# Patient Record
Sex: FEMALE | Race: WHITE | Employment: FULL TIME | URBAN - METROPOLITAN AREA
[De-identification: names, ages, dates, MRNs, and addresses within clinical notes are randomized per-mention and may not be internally consistent; named-entity substitution may affect disease eponyms.]

---

## 2018-10-02 ENCOUNTER — OFFICE VISIT (OUTPATIENT)
Dept: ORTHOPEDIC SURGERY | Age: 33
End: 2018-10-02
Payer: COMMERCIAL

## 2018-10-02 VITALS
HEIGHT: 65 IN | BODY MASS INDEX: 24.16 KG/M2 | SYSTOLIC BLOOD PRESSURE: 116 MMHG | WEIGHT: 145 LBS | DIASTOLIC BLOOD PRESSURE: 82 MMHG

## 2018-10-02 DIAGNOSIS — M25.371 INSTABILITY OF RIGHT ANKLE JOINT: Primary | ICD-10-CM

## 2018-10-02 PROCEDURE — L1902 AFO ANKLE GAUNTLET PRE OTS: HCPCS | Performed by: ORTHOPAEDIC SURGERY

## 2018-10-02 PROCEDURE — L4361 PNEUMA/VAC WALK BOOT PRE OTS: HCPCS | Performed by: ORTHOPAEDIC SURGERY

## 2018-10-02 PROCEDURE — 99204 OFFICE O/P NEW MOD 45 MIN: CPT | Performed by: ORTHOPAEDIC SURGERY

## 2018-10-02 RX ORDER — PREDNISONE 10 MG/1
TABLET ORAL
Qty: 14 TABLET | Refills: 0 | Status: SHIPPED | OUTPATIENT
Start: 2018-10-02

## 2018-10-03 ENCOUNTER — TELEPHONE (OUTPATIENT)
Dept: ORTHOPEDIC SURGERY | Age: 33
End: 2018-10-03

## 2018-10-17 ENCOUNTER — HOSPITAL ENCOUNTER (OUTPATIENT)
Dept: PHYSICAL THERAPY | Age: 33
Setting detail: THERAPIES SERIES
Discharge: HOME | End: 2018-10-17
Payer: COMMERCIAL

## 2018-10-17 PROCEDURE — 97161 PT EVAL LOW COMPLEX 20 MIN: CPT | Performed by: PHYSICAL THERAPIST

## 2018-10-17 PROCEDURE — 97110 THERAPEUTIC EXERCISES: CPT | Performed by: PHYSICAL THERAPIST

## 2018-10-17 PROCEDURE — 97112 NEUROMUSCULAR REEDUCATION: CPT | Performed by: PHYSICAL THERAPIST

## 2018-10-17 PROCEDURE — G8979 MOBILITY GOAL STATUS: HCPCS | Performed by: PHYSICAL THERAPIST

## 2018-10-17 PROCEDURE — G8978 MOBILITY CURRENT STATUS: HCPCS | Performed by: PHYSICAL THERAPIST

## 2018-10-17 NOTE — PLAN OF CARE
Walking and moving around  Mobility: Walking and Moving Around Current Status (): At least 40 percent but less than 60 percent impaired, limited or restricted  Mobility: Walking and Moving Around Goal Status (): At least 20 percent but less than 40 percent impaired, limited or restricted    Pain Scale: 0/10  Easing factors: boot, resting  Provocative factors: activity     Type: []Constant   []Intermittent  []Radiating []Localized []other:     Numbness/Tingling: none    Occupation/School:     Living Status/Prior Level of Function: Independent with ADLs and IADLs, horseback riding, walking program, HIT workout, weight lifting routine    OBJECTIVE:     ROM LEFT RIGHT   HIP Flex     HIP Abd     HIP Ext     HIP IR     HIP ER     Knee ext     Knee Flex     Ankle PF  WNL   Ankle DF  +3   Ankle In  40   Ankle Ev  20   Strength  LEFT RIGHT   HIP Flexors  4-   HIP Abductors  3+   HIP Ext  4-  Decreased glute firing- back first and hard   Hip ER  3+   Knee EXT (quad)  5   Knee Flex (HS)     Ankle DF  4   Ankle PF     Ankle Inv  5-   Ankle EV  4   Great toe ext/ flex  4 both   Circumference  Mid apex  7 cm prox             Reflexes/Sensation:    []Dermatomes/Myotomes intact    [x]Reflexes equal and normal bilaterally   []Other:    Joint mobility:    []Normal    []Hypo   [x]Hyper    Palpation:  Not grossly TTP; minimal over ATFL    Functional Mobility/Transfers: ind. Posture: forefoot varus/ PF first ray/ calc amol (increased in WB)    Bandages/Dressings/Incisions: NA    Gait: (include devices/WB status) using walking boot. overpronation through both feet, foot slap noted B    Orthopedic Special Tests: SL stance poor R> L, EC>EO                       [x] Patient history, allergies, meds reviewed. Medical chart reviewed. See intake form. Review Of Systems (ROS):  [x]Performed Review of systems (Integumentary, CardioPulmonary, Neurological) by intake and observation.  Intake form has been scanned into medical record. Patient has been instructed to contact their primary care physician regarding ROS issues if not already being addressed at this time. Co-morbidities/Complexities (which will affect course of rehabilitation):   [x]None           Arthritic conditions   []Rheumatoid arthritis (M05.9)  []Osteoarthritis (M19.91)   Cardiovascular conditions   []Hypertension (I10)  []Hyperlipidemia (E78.5)  []Angina pectoris (I20)  []Atherosclerosis (I70)   Musculoskeletal conditions   []Disc pathology   []Congenital spine pathologies   []Prior surgical intervention  []Osteoporosis (M81.8)  []Osteopenia (M85.8)   Endocrine conditions   []Hypothyroid (E03.9)  []Hyperthyroid Gastrointestinal conditions   []Constipation (D05.13)   Metabolic conditions   []Morbid obesity (E66.01)  []Diabetes type 1(E10.65) or 2 (E11.65)   []Neuropathy (G60.9)     Pulmonary conditions   []Asthma (J45)  []Coughing   []COPD (J44.9)   Psychological Disorders  []Anxiety (F41.9)  []Depression (F32.9)   []Other:   []Other:          Barriers to/and or personal factors that will affect rehab potential:              [x]Age  []Sex              [x]Motivation/Lack of Motivation                        [x]Co-Morbidities              []Cognitive Function, education/learning barriers              []Environmental, home barriers              []profession/work barriers  [x]past PT/medical experience  []other:  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 [] Falls Risk assessed and Patient requires intervention due to being higher risk   TUG score (>12s at risk):     [] Falls education provided, including       G-Codes:  PT G-Codes  Functional Assessment Tool Used: LEFS  Score: 53%  Functional Limitation: Mobility: Walking and moving around  Mobility: Walking and Moving Around Current Status ():  At least 40 percent but less than 60 percent

## 2018-10-24 ENCOUNTER — HOSPITAL ENCOUNTER (OUTPATIENT)
Dept: PHYSICAL THERAPY | Age: 33
Setting detail: THERAPIES SERIES
Discharge: HOME | End: 2018-10-24
Payer: COMMERCIAL

## 2018-10-24 PROCEDURE — 97110 THERAPEUTIC EXERCISES: CPT | Performed by: PHYSICAL THERAPIST

## 2018-10-24 PROCEDURE — 97112 NEUROMUSCULAR REEDUCATION: CPT | Performed by: PHYSICAL THERAPIST

## 2018-10-24 NOTE — FLOWSHEET NOTE
[facility], [address] [place]  Phone: [phone]  Fax [phone]    Physical Therapy Daily Treatment Note  Date:  10/24/2018    Patient Name:  Fiordaliza Siegel    :  1985  MRN: [id number]  Restrictions/Precautions:    Medical/Treatment Diagnosis Information:  Diagnosis: R23.140 (ICD-10-CM) - Instability of right ankle joint  Treatment Diagnosis: M25.371 (ICD-10-CM) - Instability of right ankle joint  Insurance/Certification information:  PT Insurance Information: 10/4/18 ANTHEM - $50/25 - $5000DED - $0CP - 20PT - 80/20% - NEEDS AUTH  Physician Information:  Referring Practitioner: Navjot Irizarry of care signed (Y/N):     Date of Patient follow up with Physician:     G-Code (if applicable):      Date G-Code Applied:  10-       Progress Note: [x]  Yes  []  No  Next due by: Visit #10       Latex Allergy:  [x]NO      []YES  Preferred Language for Healthcare:   [x]English       []other:    Visit # Insurance Allowable Requires auth   2 30    []no        [x]yes:       Pain level:  0/10     SUBJECTIVE:  Been walking more in the boot and not having a lot of pain. She notes improvement in the ankle/ foot control.      OBJECTIVE:   Observation:   Test measurements:      RESTRICTIONS/PRECAUTIONS: hypermobility noted, boot; wean to brace and shoe for activity    Exercises/Interventions:     Therapeutic Ex Sets/sec Reps Notes   Clamshell- heels elevated H5/ 2 10 hep   Towel scrunch w/ lift 20 reps  hep   Toe yoga H5 10 reps ea hep   Wobble board: 12,3,6,9,  20 reps ea    Seated TR/HR with ADD 2 10 ea                                        Manual Intervention                                          NMR re-education      glute re-ed sequence- tania 5 H5 5 ea hep   Prone over pillow- glute set  - SLR ext  -glute kick   H5   10 ea B                D1/ D2 ankle motion   Perturbations in neutral, PF,DF   2 sets 20 ea  25/ 10/10

## 2018-10-24 NOTE — FLOWSHEET NOTE
[facility], [address] [place]  Phone: [phone]  Fax [phone]      ATHLETIC TRAINING [address]  Date:  10/24/2018    Patient Name:  Aparna Ramirez    :  1985  MRN: [id number]  Restrictions/Precautions:    Medical/Treatment Diagnosis Information:  ·  R ankle joint instability  ·    Physician Information:   Dr. 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 Ecc.                      Inv. Calf Press Álvaro. Ecc.                        Inv.        GREGG   Flex               ABd               ADd              TKE               Ext        Steps Up               Up and Over               Down               Lateral               Rotation        Squats  mini                  wall                 BOSU         Lunges:  Lunge to Balance                   Balance to Lunge                   Walking        Knee Extension Bilat. Ecc.                               Inv. Hamstring Curls Bilat.                                Ecc.                               Inv.

## 2018-10-26 ENCOUNTER — HOSPITAL ENCOUNTER (OUTPATIENT)
Dept: PHYSICAL THERAPY | Age: 33
Setting detail: THERAPIES SERIES
Discharge: HOME | End: 2018-10-26
Payer: COMMERCIAL

## 2018-10-26 PROCEDURE — 97110 THERAPEUTIC EXERCISES: CPT | Performed by: PHYSICAL THERAPIST

## 2018-10-26 PROCEDURE — 97112 NEUROMUSCULAR REEDUCATION: CPT | Performed by: PHYSICAL THERAPIST

## 2018-10-26 NOTE — FLOWSHEET NOTE
Ecc.                                Inv.          Soleus Press Bilat. Ecc.                            Inv.                                Ladders                 Square                Jump/Hop  Low                       Med.                       High                                Reformer FW Walking 2R 2x10 2R 2x10   Reformer FW Parallel Heels 2R 2x10 ball  2R 2x10 ball    Reformer FW V Toes 2R 2x10 2R 2x10   Reformer FW Wide V Heels 2R 2x10 2R 2x10 V Toes   Reformer Arm Straps I/Y/T in TT 2R 2x10 ea 1R1B 2x10 ea   Reformer Arm Straps I hold w/alt kickout 2R 2x10 1R1B 2x10   Reformer Leg Straps Parallel Feet 2R ball add, ring abd 10x ea 2R ball add, ring abd 15x ea   Reformer Leg Straps V Feet 2R ball add 10x 2R ball add 15x   Reformer Leg Straps Circles 2R 10x ea 2R 15x ea                                  Modality declined declined   Initials                             JLW JLW   Time spent with PT assistant

## 2018-10-26 NOTE — FLOWSHEET NOTE
stance  H15 3    D1/ D2 ankle motion   Perturbations in neutral, PF,DF    20 ea      Weight shift  + lift  10  10 B        Therapeutic Exercise and NMR EXR  [x] (79942) Provided verbal/tactile cueing for activities related to strengthening, flexibility, endurance, ROM for improvements in LE, proximal hip, and core control with self care, mobility, lifting, ambulation. [x] (22905) Provided verbal/tactile cueing for activities related to improving balance, coordination, kinesthetic sense, posture, motor skill, proprioception  to assist with LE, proximal hip, and core control in self care, mobility, lifting, ambulation and eccentric single leg control.      NMR and Therapeutic Activities:    [x] (31308 or 46937) Provided verbal/tactile cueing for activities related to improving balance, coordination, kinesthetic sense, posture, motor skill, proprioception and motor activation to allow for proper function of core, proximal hip and LE with self care and ADLs  [x] (06866) Gait Re-education- Provided training and instruction to the patient for proper LE, core and proximal hip recruitment and positioning and eccentric body weight control with ambulation re-education including up and down stairs     Home Exercise Program:    [x] (74354) Reviewed/Progressed HEP activities related to strengthening, flexibility, endurance, ROM of core, proximal hip and LE for functional self-care, mobility, lifting and ambulation/stair navigation   [] (24384)Reviewed/Progressed HEP activities related to improving balance, coordination, kinesthetic sense, posture, motor skill, proprioception of core, proximal hip and LE for self care, mobility, lifting, and ambulation/stair navigation      Manual Treatments:  PROM / STM / Oscillations-Mobs:  G-I, II, III, IV (PA's, Inf., Post.)  [x] (98550) Provided manual therapy to mobilize LE, proximal hip and/or LS spine soft tissue/joints for the purpose of modulating pain, promoting relaxation,  increasing

## 2018-10-29 ENCOUNTER — HOSPITAL ENCOUNTER (OUTPATIENT)
Dept: PHYSICAL THERAPY | Age: 33
Setting detail: THERAPIES SERIES
Discharge: HOME | End: 2018-10-29
Payer: COMMERCIAL

## 2018-10-29 PROCEDURE — 97112 NEUROMUSCULAR REEDUCATION: CPT | Performed by: PHYSICAL THERAPIST

## 2018-10-29 PROCEDURE — 97110 THERAPEUTIC EXERCISES: CPT | Performed by: PHYSICAL THERAPIST

## 2018-11-01 ENCOUNTER — HOSPITAL ENCOUNTER (OUTPATIENT)
Dept: PHYSICAL THERAPY | Age: 33
Setting detail: THERAPIES SERIES
Discharge: HOME | End: 2018-11-01
Payer: COMMERCIAL

## 2018-11-01 PROCEDURE — 97140 MANUAL THERAPY 1/> REGIONS: CPT | Performed by: PHYSICAL THERAPIST

## 2018-11-01 PROCEDURE — G0283 ELEC STIM OTHER THAN WOUND: HCPCS | Performed by: PHYSICAL THERAPIST

## 2018-11-01 PROCEDURE — 97110 THERAPEUTIC EXERCISES: CPT | Performed by: PHYSICAL THERAPIST

## 2018-11-01 PROCEDURE — 97112 NEUROMUSCULAR REEDUCATION: CPT | Performed by: PHYSICAL THERAPIST

## 2018-11-01 NOTE — FLOWSHEET NOTE
[facility] [address] [place]  Phone: [phone]  Fax [phone]      ATHLETIC TRAINING [address] [facility]  Date:  2018    Patient Name:  Sylvia Salmeron    :  1985  MRN: [id number]  Restrictions/Precautions:    Medical/Treatment Diagnosis Information:  ·  R ankle joint instability     Physician Information:   Dr. 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 Ecc.                        Inv. Calf Press Álvaro. Ecc.                          Inv.            GREGG   Flex                 ABd                 ADd                TKE                 Ext            Steps Up                 Up and Over                 Down                 Lateral                 Rotation            Squats  mini                    wall                   BOSU             Lunges:  Lunge to Balance                     Balance to Lunge                     Walking            Knee Extension Bilat.                                                  Ecc.

## 2018-11-05 ENCOUNTER — HOSPITAL ENCOUNTER (OUTPATIENT)
Dept: PHYSICAL THERAPY | Age: 33
Setting detail: THERAPIES SERIES
Discharge: HOME | End: 2018-11-05
Payer: COMMERCIAL

## 2018-11-05 PROCEDURE — 97112 NEUROMUSCULAR REEDUCATION: CPT | Performed by: PHYSICAL THERAPIST

## 2018-11-05 PROCEDURE — 97110 THERAPEUTIC EXERCISES: CPT | Performed by: PHYSICAL THERAPIST

## 2018-11-05 PROCEDURE — 97140 MANUAL THERAPY 1/> REGIONS: CPT | Performed by: PHYSICAL THERAPIST

## 2018-11-05 PROCEDURE — G0283 ELEC STIM OTHER THAN WOUND: HCPCS | Performed by: PHYSICAL THERAPIST

## 2018-11-05 NOTE — FLOWSHEET NOTE
[facility], [address] [place]  Phone: [phone]  Fax [phone]      ATHLETIC TRAINING [address]  Date:  2018    Patient Name:  Saeid Hus    :  1985  MRN: [id number]  Restrictions/Precautions:    Medical/Treatment Diagnosis Information:  ·  R ankle joint instability     Physician Information:   Dr. 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), hypermobile joints Out of boot, in brace      Bike       Elliptical       Treadmill       Airdyne              Gastroc stretch       Soleus stretch       Hamstring stretch       ITB stretch       Hip Flexor stretch       Quad stretch       Adductor stretch              Weight Shifting sp                                 fp                                 tp       Lateral walking (with/w/o TB)              Balance: PEP/Eleonora board    Eleonora Board R Ant/Pos Med/Lat CW/CCW 15x ea                  SLS    Airex FS/LS/TS 15x         Star excursion load/explode             Extremity reach UE/LE              Leg Press Álvaro. Ecc. 100# 3x10                     Inv.    80# 3x10          Calf Press Álvaro. 100# 3x10                      Ecc.      80# 3x10                      Inv.              GREGG   Flex                  ABd                  ADd                 TKE                  Ext              Steps Up                  Up and Over                  Down                  Lateral                  Rotation              Squats  mini                     wall                    BOSU               Lunges:  Lunge to Balance

## 2018-11-05 NOTE — FLOWSHEET NOTE
clams H30 3 reps each side    Side stepping with BVL at ankles      RDL 3# 2x10 reps    Monster walks with BVL  Manual Intervention      PF STM/ calc amol mob 10 mins                                   NMR re-education      glute re-ed sequence- tania 5 H5 5 ea hep   Prone over pillow- glute set  - SLR ext  -glute kick    SLS  -double YTB H ADD  -GTB ext   H5   10  2 x 10    SLS/ Tandem stance  H15 3    D1/ D2 ankle motion   Perturbations in neutral, PF,DF    2      NMR: BVL amol H10:10 10'         Therapeutic Exercise and NMR EXR  [x] (66182) Provided verbal/tactile cueing for activities related to strengthening, flexibility, endurance, ROM for improvements in LE, proximal hip, and core control with self care, mobility, lifting, ambulation. [x] (80270) Provided verbal/tactile cueing for activities related to improving balance, coordination, kinesthetic sense, posture, motor skill, proprioception  to assist with LE, proximal hip, and core control in self care, mobility, lifting, ambulation and eccentric single leg control.      NMR and Therapeutic Activities:    [x] (69947 or 25896) Provided verbal/tactile cueing for activities related to improving balance, coordination, kinesthetic sense, posture, motor skill, proprioception and motor activation to allow for proper function of core, proximal hip and LE with self care and ADLs  [x] (34163) Gait Re-education- Provided training and instruction to the patient for proper LE, core and proximal hip recruitment and positioning and eccentric body weight control with ambulation re-education including up and down stairs     Home Exercise Program:    [x] (49389) Reviewed/Progressed HEP activities related to strengthening, flexibility, endurance, ROM of core, proximal hip and LE for functional self-care, mobility, lifting and ambulation/stair navigation   [] (78523)Reviewed/Progressed HEP activities related to improving balance, coordination, kinesthetic sense, posture, motor skill, proprioception of core, proximal hip and LE for self care, mobility, lifting, and ambulation/stair navigation      Manual Treatments:  PROM / STM / Oscillations-Mobs:  G-I, II, III, IV (PA's, Inf., Post.)  [x] (77878) Provided manual therapy to mobilize LE, proximal hip and/or LS spine soft tissue/joints for the purpose of modulating pain, promoting relaxation,  increasing ROM, reducing/eliminating soft tissue swelling/inflammation/restriction, improving soft tissue extensibility and allowing for proper ROM for normal function with self care, mobility, lifting and ambulation. Modalities:  Declined     Charges:  Timed Code Treatment Minutes: 50   Total Treatment Minutes: 50 (ATC)     [x] EVAL (LOW) 74548 (typically 20 minutes face-to-face)  [] EVAL (MOD) 73165 (typically 30 minutes face-to-face)  [] EVAL (HIGH) 31418 (typically 45 minutes face-to-face)  [] RE-EVAL     [x] EM(46171) x  1   [] IONTO  [x] NMR (22381) x  1   [] VASO  [x] Manual (47621) x  1    [] Other:  [] TA x       [] Mech Traction (87354)  [] ES(attended) (23710)      [x] ES (un) (03269): NMR    GOALS:  Patient stated goal: return to work     Therapist goals for Patient:   Short Term Goals: To be achieved in: 2 weeks  1. Independent in HEP and progression per patient tolerance, in order to prevent re-injury. 2. Patient will have a decrease in pain to facilitate improvement in movement, function, and ADLs as indicated by Functional Deficits.     Long Term Goals: To be achieved in: 8 weeks  1. Disability index score of 20% or less for the LEFS to assist with reaching prior level of function. 2. Patient will demonstrate increased AROM to [facility] to allow for proper joint functioning as indicated by patients Functional Deficits. 3. Patient will demonstrate an increase in Strength to good proximal hip strength and control, within 5lb HHD in LE to allow for proper functional mobility as indicated by patients Functional Deficits.    4. Patient will

## 2018-11-08 ENCOUNTER — HOSPITAL ENCOUNTER (OUTPATIENT)
Dept: PHYSICAL THERAPY | Age: 33
Setting detail: THERAPIES SERIES
Discharge: HOME | End: 2018-11-08
Payer: COMMERCIAL

## 2018-11-08 PROCEDURE — 97112 NEUROMUSCULAR REEDUCATION: CPT | Performed by: PHYSICAL THERAPIST

## 2018-11-08 PROCEDURE — 97110 THERAPEUTIC EXERCISES: CPT | Performed by: PHYSICAL THERAPIST

## 2018-11-08 PROCEDURE — G0283 ELEC STIM OTHER THAN WOUND: HCPCS | Performed by: PHYSICAL THERAPIST

## 2018-11-12 ENCOUNTER — HOSPITAL ENCOUNTER (OUTPATIENT)
Dept: PHYSICAL THERAPY | Age: 33
Setting detail: THERAPIES SERIES
Discharge: HOME | End: 2018-11-12
Payer: COMMERCIAL

## 2018-11-12 PROCEDURE — 97112 NEUROMUSCULAR REEDUCATION: CPT | Performed by: PHYSICAL THERAPIST

## 2018-11-12 PROCEDURE — 97110 THERAPEUTIC EXERCISES: CPT | Performed by: PHYSICAL THERAPIST

## 2018-11-12 NOTE — FLOWSHEET NOTE
[facility], [address] [place]  Phone: [phone]  Fax [phone]    Physical Therapy Daily Treatment Note  Date:  2018    Patient Name:  Petrona Perdomo    :  1985  MRN: [id number]  Restrictions/Precautions:    Medical/Treatment Diagnosis Information:  Diagnosis: Z67.826 (ICD-10-CM) - Instability of right ankle joint  Treatment Diagnosis: M25.371 (ICD-10-CM) - Instability of right ankle joint  Insurance/Certification information:  PT Insurance Information: 10/4/18 ANTHEM - $50/25 - $5000DED - $0CP - 20PT - 80/20% - NEEDS AUTH  Physician Information:  Referring Practitioner: Sowmya Woodruff of care signed (Y/N):     Date of Patient follow up with Physician:     G-Code (if applicable):      Date G-Code Applied:  10-       Progress Note: []  Yes  [x]  No  Next due by: Visit #10       Latex Allergy:  [x]NO      []YES  Preferred Language for Healthcare:   [x]English       []other:    Visit # Insurance Allowable Requires auth   8 30    []no        [x]yes:       Pain level:  1/10     SUBJECTIVE:  Pt denies difficulty with partial boot camp workout. Doing well- no c/o instability.        OBJECTIVE:    Observation:   Test measurements:      RESTRICTIONS/PRECAUTIONS: hypermobility noted, boot; wean to brace and shoe for activity    Exercises/Interventions:     Therapeutic Ex Sets/sec Reps Notes   Clamshell- heels elevated OVL H5/ 2 15 hep   Towel scrunch w/ lift   hep   Toe yoga H5  hep   Wobble board: 12,3,6,9,  25 reps eaStanding    Nose taps at wall for Soleus 3 x10 reps Incline stretches H30 5 reps    [employer] on R 3 10    Standing HR w/ ADD  W/ BTB amol/ inv bias 3 10   15 reps ea + to HEP   Soleus press 75# 2/ 3D 15 reps + to HEP   Standing glute kick/ fire hydrants 30\" 2 B    Bike/Elliptical 7-8' ea  With brace   [place] with 2' step- BVL: A/P 30\" 3 ea                   Manual Intervention

## 2018-11-14 ENCOUNTER — HOSPITAL ENCOUNTER (OUTPATIENT)
Dept: PHYSICAL THERAPY | Age: 33
Setting detail: THERAPIES SERIES
Discharge: HOME | End: 2018-11-14
Payer: COMMERCIAL

## 2018-11-14 PROCEDURE — 97112 NEUROMUSCULAR REEDUCATION: CPT | Performed by: PHYSICAL THERAPIST

## 2018-11-14 PROCEDURE — 97110 THERAPEUTIC EXERCISES: CPT | Performed by: PHYSICAL THERAPIST

## 2018-11-23 ENCOUNTER — HOSPITAL ENCOUNTER (OUTPATIENT)
Dept: PHYSICAL THERAPY | Age: 33
Setting detail: THERAPIES SERIES
Discharge: HOME | End: 2018-11-23
Payer: COMMERCIAL

## 2018-11-23 PROCEDURE — 97110 THERAPEUTIC EXERCISES: CPT | Performed by: PHYSICAL THERAPIST

## 2018-11-23 PROCEDURE — G8980 MOBILITY D/C STATUS: HCPCS | Performed by: PHYSICAL THERAPIST

## 2018-11-23 PROCEDURE — G8979 MOBILITY GOAL STATUS: HCPCS | Performed by: PHYSICAL THERAPIST

## 2018-11-23 PROCEDURE — 97112 NEUROMUSCULAR REEDUCATION: CPT | Performed by: PHYSICAL THERAPIST

## 2018-11-23 PROCEDURE — G8978 MOBILITY CURRENT STATUS: HCPCS | Performed by: PHYSICAL THERAPIST

## 2018-11-27 ENCOUNTER — APPOINTMENT (OUTPATIENT)
Dept: PHYSICAL THERAPY | Age: 33
End: 2018-11-27
Payer: COMMERCIAL

## 2023-06-30 ENCOUNTER — HOSPITAL ENCOUNTER (OUTPATIENT)
Dept: WOMENS IMAGING | Age: 38
End: 2023-06-30
Payer: COMMERCIAL

## 2023-06-30 ENCOUNTER — HOSPITAL ENCOUNTER (OUTPATIENT)
Dept: WOMENS IMAGING | Age: 38
Discharge: HOME | End: 2023-06-30
Payer: COMMERCIAL

## 2023-06-30 DIAGNOSIS — N64.4 MASTODYNIA: ICD-10-CM

## 2023-06-30 PROCEDURE — G0279 TOMOSYNTHESIS, MAMMO: HCPCS

## 2025-06-17 ENCOUNTER — HOSPITAL ENCOUNTER (OUTPATIENT)
Dept: WOMENS IMAGING | Age: 40
Discharge: HOME | End: 2025-06-17
Payer: COMMERCIAL

## 2025-06-17 VITALS — BODY MASS INDEX: 24.16 KG/M2 | HEIGHT: 65 IN | WEIGHT: 145 LBS

## 2025-06-17 DIAGNOSIS — Z12.31 ENCOUNTER FOR SCREENING MAMMOGRAM FOR MALIGNANT NEOPLASM OF BREAST: ICD-10-CM

## 2025-06-17 PROCEDURE — 77067 SCR MAMMO BI INCL CAD: CPT
